# Patient Record
Sex: FEMALE | Race: WHITE
[De-identification: names, ages, dates, MRNs, and addresses within clinical notes are randomized per-mention and may not be internally consistent; named-entity substitution may affect disease eponyms.]

---

## 2018-05-24 ENCOUNTER — HOSPITAL ENCOUNTER (OUTPATIENT)
Dept: HOSPITAL 83 - D | Age: 68
Discharge: HOME | End: 2018-05-24
Attending: INTERNAL MEDICINE
Payer: MEDICARE

## 2018-05-24 DIAGNOSIS — E11.9: Primary | ICD-10-CM

## 2019-05-20 ENCOUNTER — OFFICE VISIT (OUTPATIENT)
Dept: FAMILY MEDICINE CLINIC | Age: 69
End: 2019-05-20
Payer: MEDICARE

## 2019-05-20 VITALS
DIASTOLIC BLOOD PRESSURE: 80 MMHG | OXYGEN SATURATION: 97 % | HEART RATE: 60 BPM | HEIGHT: 62 IN | SYSTOLIC BLOOD PRESSURE: 120 MMHG | TEMPERATURE: 97.6 F | WEIGHT: 185.6 LBS | BODY MASS INDEX: 34.16 KG/M2

## 2019-05-20 DIAGNOSIS — L03.114 CELLULITIS OF LEFT HAND: Primary | ICD-10-CM

## 2019-05-20 DIAGNOSIS — E11.9 TYPE 2 DIABETES MELLITUS WITHOUT COMPLICATION, WITH LONG-TERM CURRENT USE OF INSULIN (HCC): ICD-10-CM

## 2019-05-20 DIAGNOSIS — Z79.4 TYPE 2 DIABETES MELLITUS WITHOUT COMPLICATION, WITH LONG-TERM CURRENT USE OF INSULIN (HCC): ICD-10-CM

## 2019-05-20 DIAGNOSIS — I10 ESSENTIAL HYPERTENSION: ICD-10-CM

## 2019-05-20 PROBLEM — R31.29 MICROHEMATURIA: Status: ACTIVE | Noted: 2017-10-16

## 2019-05-20 PROCEDURE — 99213 OFFICE O/P EST LOW 20 MIN: CPT | Performed by: NURSE PRACTITIONER

## 2019-05-20 PROCEDURE — 96372 THER/PROPH/DIAG INJ SC/IM: CPT | Performed by: NURSE PRACTITIONER

## 2019-05-20 RX ORDER — POTASSIUM GLUCONATE 2 MEQ
TABLET ORAL
COMMUNITY

## 2019-05-20 RX ORDER — INSULIN GLARGINE 100 [IU]/ML
100 INJECTION, SOLUTION SUBCUTANEOUS NIGHTLY
COMMUNITY
Start: 2018-05-07

## 2019-05-20 RX ORDER — NICOTINE POLACRILEX 2 MG
GUM BUCCAL
COMMUNITY

## 2019-05-20 RX ORDER — CEPHALEXIN 500 MG/1
500 CAPSULE ORAL 3 TIMES DAILY
Qty: 21 CAPSULE | Refills: 0 | Status: SHIPPED | OUTPATIENT
Start: 2019-05-20 | End: 2019-06-07

## 2019-05-20 RX ORDER — TRIAMTERENE AND HYDROCHLOROTHIAZIDE 37.5; 25 MG/1; MG/1
CAPSULE ORAL
COMMUNITY
Start: 2016-02-21 | End: 2019-06-07 | Stop reason: SDUPTHER

## 2019-05-20 RX ORDER — BISOPROLOL FUMARATE AND HYDROCHLOROTHIAZIDE 5; 6.25 MG/1; MG/1
TABLET ORAL
COMMUNITY
Start: 2016-07-02 | End: 2019-06-07 | Stop reason: SDUPTHER

## 2019-05-20 RX ORDER — SIMVASTATIN 80 MG
TABLET ORAL
COMMUNITY
Start: 2016-02-21 | End: 2019-06-07 | Stop reason: SDUPTHER

## 2019-05-20 RX ORDER — METHYLPREDNISOLONE ACETATE 40 MG/ML
40 INJECTION, SUSPENSION INTRA-ARTICULAR; INTRALESIONAL; INTRAMUSCULAR; SOFT TISSUE ONCE
Status: COMPLETED | OUTPATIENT
Start: 2019-05-20 | End: 2019-05-20

## 2019-05-20 RX ORDER — KRILL/OM-3/DHA/EPA/PHOSPHO/AST 1000-230MG
CAPSULE ORAL
COMMUNITY

## 2019-05-20 RX ORDER — UREA 10 %
LOTION (ML) TOPICAL
COMMUNITY

## 2019-05-20 RX ORDER — LEVOTHYROXINE SODIUM 0.07 MG/1
TABLET ORAL
COMMUNITY
Start: 2016-02-21 | End: 2019-06-07 | Stop reason: SDUPTHER

## 2019-05-20 RX ORDER — FLUTICASONE PROPIONATE 50 MCG
SPRAY, SUSPENSION (ML) NASAL
COMMUNITY
End: 2019-11-15 | Stop reason: SDUPTHER

## 2019-05-20 RX ORDER — ESTRADIOL 1 MG/1
0.5 TABLET ORAL DAILY
COMMUNITY
Start: 2016-07-02

## 2019-05-20 RX ADMIN — METHYLPREDNISOLONE ACETATE 40 MG: 40 INJECTION, SUSPENSION INTRA-ARTICULAR; INTRALESIONAL; INTRAMUSCULAR; SOFT TISSUE at 10:26

## 2019-05-20 NOTE — PROGRESS NOTES
2019     Tracie Bansal (:  1950) is a 68 y.o. female, here for evaluation of the following medical concerns:  Chief Complaint   Patient presents with    Hand Pain     swelling, itchy, bug bite? HPI:  68 y.o. female presents with swollen left hand that happened over the weekend. Then yesterday another bite site on upper left arm. Feels fine otherwise. ROS: All pertinent positive and neg's per HPI    Past Medical History:   Diagnosis Date    Colon cancer ([facility] [address]     HTN (hypertension)        Current Outpatient Medications on File Prior to Visit   Medication Sig Dispense Refill    estradiol (ESTRACE) 1 MG tablet Take by mouth      insulin glargine (LANTUS) 100 UNIT/ML injection vial       levothyroxine (SYNTHROID) 75 MCG tablet Take by mouth      metFORMIN (GLUCOPHAGE) 1000 MG tablet 500 mg      simvastatin (ZOCOR) 80 MG tablet Take by mouth      triamterene-hydrochlorothiazide (DYAZIDE) 37.5-25 MG per capsule Take by mouth      bisoprolol-hydrochlorothiazide (ZIAC) 5-6.25 MG per tablet Take by mouth      ASA-APAP-Caff Buffered 227-194-33 MG TABS Take by mouth      L-Lysine 1000 MG TABS Take by mouth      Magnesium 100 MG CAPS       Omega-3 Krill Oil 1000 MG CAPS Take by mouth      Potassium Gluconate 2 MEQ TABS Take by mouth      Biotin 1 MG CAPS Take by mouth      fluticasone (FLONASE) 50 MCG/ACT nasal spray       folic acid (FOLVITE) 517 MCG tablet Take by mouth       No current facility-administered medications on file prior to visit. Allergies   Allergen Reactions    Levaquin [Levofloxacin In D5w]      Itchy         No family history on file.     Past Surgical History:   Procedure Laterality Date    BLADDER REPAIR      HEMICOLECTOMY         Social History     Tobacco Use    Smoking status: Not on file   Substance Use Topics    Alcohol use: Not on file    Drug use: Not on file       ROS:      Review of Systems            Vitals:    19 0954   BP:

## 2019-06-03 ENCOUNTER — TELEPHONE (OUTPATIENT)
Dept: FAMILY MEDICINE CLINIC | Age: 69
End: 2019-06-03

## 2019-06-03 NOTE — TELEPHONE ENCOUNTER
While the afternoon blood sugars are slightly high her fasting sugars are okay.  No additional changes at this time

## 2019-06-07 ENCOUNTER — OFFICE VISIT (OUTPATIENT)
Dept: FAMILY MEDICINE CLINIC | Age: 69
End: 2019-06-07
Payer: MEDICARE

## 2019-06-07 ENCOUNTER — HOSPITAL ENCOUNTER (OUTPATIENT)
Age: 69
Discharge: HOME | End: 2019-06-09
Payer: MEDICARE

## 2019-06-07 VITALS
BODY MASS INDEX: 33.86 KG/M2 | HEIGHT: 62 IN | OXYGEN SATURATION: 98 % | HEART RATE: 81 BPM | TEMPERATURE: 98 F | DIASTOLIC BLOOD PRESSURE: 86 MMHG | WEIGHT: 184 LBS | SYSTOLIC BLOOD PRESSURE: 124 MMHG

## 2019-06-07 DIAGNOSIS — E11.9 TYPE 2 DIABETES MELLITUS WITHOUT COMPLICATION, WITHOUT LONG-TERM CURRENT USE OF INSULIN (HCC): ICD-10-CM

## 2019-06-07 DIAGNOSIS — I10 ESSENTIAL HYPERTENSION, BENIGN: ICD-10-CM

## 2019-06-07 DIAGNOSIS — Z12.31 SCREENING MAMMOGRAM, ENCOUNTER FOR: ICD-10-CM

## 2019-06-07 DIAGNOSIS — E78.2 MIXED HYPERLIPIDEMIA: ICD-10-CM

## 2019-06-07 DIAGNOSIS — E11.9 TYPE 2 DIABETES MELLITUS WITHOUT COMPLICATION, WITHOUT LONG-TERM CURRENT USE OF INSULIN (HCC): Primary | ICD-10-CM

## 2019-06-07 PROBLEM — Z85.038 PERSONAL HISTORY OF COLON CANCER: Status: ACTIVE | Noted: 2019-06-07

## 2019-06-07 LAB
ALBUMIN SERPL-MCNC: 4 G/DL (ref 3.5–5.2)
ALP BLD-CCNC: 54 U/L (ref 35–104)
ALT SERPL-CCNC: 16 U/L (ref 0–32)
ANION GAP SERPL CALCULATED.3IONS-SCNC: 17 MMOL/L (ref 7–16)
AST SERPL-CCNC: 13 U/L (ref 0–31)
BASOPHILS ABSOLUTE: 0.03 E9/L (ref 0–0.2)
BASOPHILS RELATIVE PERCENT: 0.3 % (ref 0–2)
BILIRUB SERPL-MCNC: <0.2 MG/DL (ref 0–1.2)
BILIRUBIN DIRECT: <0.2 MG/DL (ref 0–0.3)
BILIRUBIN, INDIRECT: NORMAL MG/DL (ref 0–1)
BUN BLDV-MCNC: 18 MG/DL (ref 8–23)
CALCIUM SERPL-MCNC: 9.4 MG/DL (ref 8.6–10.2)
CHLORIDE BLD-SCNC: 100 MMOL/L (ref 98–107)
CHOLESTEROL, TOTAL: 202 MG/DL (ref 0–199)
CO2: 25 MMOL/L (ref 22–29)
CREAT SERPL-MCNC: 1 MG/DL (ref 0.5–1)
EOSINOPHILS ABSOLUTE: 0.11 E9/L (ref 0.05–0.5)
EOSINOPHILS RELATIVE PERCENT: 1.2 % (ref 0–6)
GFR AFRICAN AMERICAN: >60
GFR NON-AFRICAN AMERICAN: 55 ML/MIN/1.73
GLUCOSE BLD-MCNC: 80 MG/DL (ref 74–99)
HBA1C MFR BLD: 7.3 % (ref 4–5.6)
HCT VFR BLD CALC: 36.5 % (ref 34–48)
HDLC SERPL-MCNC: 58 MG/DL
HEMOGLOBIN: 11.4 G/DL (ref 11.5–15.5)
IMMATURE GRANULOCYTES #: 0.04 E9/L
IMMATURE GRANULOCYTES %: 0.4 % (ref 0–5)
LDL CHOLESTEROL CALCULATED: 106 MG/DL (ref 0–99)
LYMPHOCYTES ABSOLUTE: 2.62 E9/L (ref 1.5–4)
LYMPHOCYTES RELATIVE PERCENT: 27.7 % (ref 20–42)
MCH RBC QN AUTO: 27.7 PG (ref 26–35)
MCHC RBC AUTO-ENTMCNC: 31.2 % (ref 32–34.5)
MCV RBC AUTO: 88.6 FL (ref 80–99.9)
MONOCYTES ABSOLUTE: 0.67 E9/L (ref 0.1–0.95)
MONOCYTES RELATIVE PERCENT: 7.1 % (ref 2–12)
NEUTROPHILS ABSOLUTE: 6 E9/L (ref 1.8–7.3)
NEUTROPHILS RELATIVE PERCENT: 63.3 % (ref 43–80)
PDW BLD-RTO: 14 FL (ref 11.5–15)
PHOSPHORUS: 4 MG/DL (ref 2.5–4.5)
PLATELET # BLD: 273 E9/L (ref 130–450)
PMV BLD AUTO: 10.8 FL (ref 7–12)
POTASSIUM SERPL-SCNC: 4.3 MMOL/L (ref 3.5–5)
RBC # BLD: 4.12 E12/L (ref 3.5–5.5)
SODIUM BLD-SCNC: 142 MMOL/L (ref 132–146)
TOTAL PROTEIN: 7 G/DL (ref 6.4–8.3)
TRIGL SERPL-MCNC: 192 MG/DL (ref 0–149)
VLDLC SERPL CALC-MCNC: 38 MG/DL
WBC # BLD: 9.5 E9/L (ref 4.5–11.5)

## 2019-06-07 PROCEDURE — 85025 COMPLETE CBC W/AUTO DIFF WBC: CPT

## 2019-06-07 PROCEDURE — 36415 COLL VENOUS BLD VENIPUNCTURE: CPT

## 2019-06-07 PROCEDURE — 82248 BILIRUBIN DIRECT: CPT

## 2019-06-07 PROCEDURE — 99214 OFFICE O/P EST MOD 30 MIN: CPT | Performed by: INTERNAL MEDICINE

## 2019-06-07 PROCEDURE — 84155 ASSAY OF PROTEIN SERUM: CPT

## 2019-06-07 PROCEDURE — 84460 ALANINE AMINO (ALT) (SGPT): CPT

## 2019-06-07 PROCEDURE — 82247 BILIRUBIN TOTAL: CPT

## 2019-06-07 PROCEDURE — 84075 ASSAY ALKALINE PHOSPHATASE: CPT

## 2019-06-07 PROCEDURE — 83036 HEMOGLOBIN GLYCOSYLATED A1C: CPT

## 2019-06-07 PROCEDURE — 84450 TRANSFERASE (AST) (SGOT): CPT

## 2019-06-07 PROCEDURE — 80061 LIPID PANEL: CPT

## 2019-06-07 PROCEDURE — 80069 RENAL FUNCTION PANEL: CPT

## 2019-06-07 RX ORDER — BISOPROLOL FUMARATE AND HYDROCHLOROTHIAZIDE 5; 6.25 MG/1; MG/1
1 TABLET ORAL DAILY
Qty: 90 TABLET | Refills: 1 | Status: SHIPPED | OUTPATIENT
Start: 2019-06-07 | End: 2019-11-15 | Stop reason: SDUPTHER

## 2019-06-07 RX ORDER — TRIAMTERENE AND HYDROCHLOROTHIAZIDE 37.5; 25 MG/1; MG/1
1 CAPSULE ORAL DAILY
Qty: 90 CAPSULE | Refills: 1 | Status: SHIPPED | OUTPATIENT
Start: 2019-06-07 | End: 2019-11-15 | Stop reason: SDUPTHER

## 2019-06-07 RX ORDER — UBIDECARENONE 75 MG
50 CAPSULE ORAL DAILY
COMMUNITY

## 2019-06-07 RX ORDER — LEVOTHYROXINE SODIUM 0.07 MG/1
75 TABLET ORAL DAILY
Qty: 90 TABLET | Refills: 1 | Status: SHIPPED | OUTPATIENT
Start: 2019-06-07 | End: 2019-11-15 | Stop reason: SDUPTHER

## 2019-06-07 RX ORDER — SIMVASTATIN 80 MG
80 TABLET ORAL NIGHTLY
Qty: 90 TABLET | Refills: 1 | Status: SHIPPED | OUTPATIENT
Start: 2019-06-07 | End: 2019-11-15 | Stop reason: SDUPTHER

## 2019-06-07 ASSESSMENT — PATIENT HEALTH QUESTIONNAIRE - PHQ9
1. LITTLE INTEREST OR PLEASURE IN DOING THINGS: 0
SUM OF ALL RESPONSES TO PHQ QUESTIONS 1-9: 0
SUM OF ALL RESPONSES TO PHQ9 QUESTIONS 1 & 2: 0
SUM OF ALL RESPONSES TO PHQ QUESTIONS 1-9: 0
2. FEELING DOWN, DEPRESSED OR HOPELESS: 0

## 2019-06-07 NOTE — PROGRESS NOTES
mg by mouth daily QOTHER DAY Yes Historical Provider, MD   fluticasone (FLONASE) 50 MCG/ACT nasal spray  Yes Historical Provider, MD   folic acid (FOLVITE) 395 MCG tablet Take by mouth Yes Historical Provider, MD   insulin glargine (LANTUS) 100 UNIT/ML injection vial Inject 100 Units into the skin nightly 35U Yes Historical Provider, MD        Social History     Tobacco Use    Smoking status: Never Smoker    Smokeless tobacco: Never Used   Substance Use Topics    Alcohol use: Not on file        Vitals:    06/07/19 1116   BP: 124/86   Pulse: 81   Temp: 98 °F (36.7 °C)   TempSrc: Temporal   SpO2: 98%   Weight: 184 lb (83.5 kg)   Height: 5' 2\" (1.575 m)     Estimated body mass index is 33.65 kg/m² as calculated from the following:    Height as of this encounter: 5' 2\" (1.575 m). Weight as of this encounter: 184 lb (83.5 kg). Physical Exam   Constitutional: She appears well-developed. HENT:   Head: Normocephalic and atraumatic. Right Ear: External ear normal.   Left Ear: External ear normal.   Mouth/Throat: Oropharynx is clear and moist.   Eyes: Pupils are equal, round, and reactive to light. Conjunctivae and EOM are normal.   Neck: Normal range of motion. No tracheal deviation present. No thyromegaly present. Cardiovascular: Normal rate, regular rhythm, normal heart sounds and intact distal pulses. Exam reveals no gallop and no friction rub. No murmur heard. Pulmonary/Chest: Effort normal and breath sounds normal. No respiratory distress. She has no wheezes. She has no rales. Abdominal: She exhibits no distension. There is no tenderness. Genitourinary: Rectal exam shows guaiac negative stool. Musculoskeletal: Normal range of motion. She exhibits no edema. Lymphadenopathy:     She has no cervical adenopathy. Neurological: She is alert. She displays normal reflexes. No cranial nerve deficit or sensory deficit. Coordination normal.   Skin: Skin is warm and dry.  Capillary refill takes less than 2 seconds. Psychiatric: Her behavior is normal.       ASSESSMENT/PLAN:    ICD-10-CM    1. Type 2 diabetes mellitus without complication, without long-term current use of insulin (HCC) E11.9 Hemoglobin A1C   2. Essential hypertension, benign I10 CBC Auto Differential     Renal Panel   3. Mixed hyperlipidemia E78.2 Lipid Panel     Hepatic Function Panel   4. Screening mammogram, encounter for Z12.31 JOSE DIGITAL SCREENING AUGMENTED BILATERAL      She is requesting a mammogram in order has been placed. Laboratory to be performed for monitoring of her diabetes. Refill medications all given today. Return in about 4 months (around 10/7/2019). An electronic signature was used to authenticate this note.     --Olga Lidia Monaco, DO on 6/11/2019 at 1:07 PM

## 2019-06-10 ENCOUNTER — TELEPHONE (OUTPATIENT)
Dept: FAMILY MEDICINE CLINIC | Age: 69
End: 2019-06-10

## 2019-06-10 NOTE — TELEPHONE ENCOUNTER
----- Message from Kesha Piedra DO sent at 6/8/2019  8:53 AM EDT -----  Very mild aneia. Kidney function good. The HbA1c is ok at 7.3 and the cholesterol barely high at 202.   Most of these high numbers are barely high, no medication changes

## 2019-06-10 NOTE — TELEPHONE ENCOUNTER
Left a message at the home # that results have been reviewed and that we are mailing a copy to the home address.

## 2019-07-02 ENCOUNTER — TELEPHONE (OUTPATIENT)
Dept: FAMILY MEDICINE CLINIC | Age: 69
End: 2019-07-02

## 2019-10-22 ENCOUNTER — OFFICE VISIT (OUTPATIENT)
Dept: FAMILY MEDICINE CLINIC | Age: 69
End: 2019-10-22
Payer: MEDICARE

## 2019-10-22 VITALS
HEIGHT: 62 IN | BODY MASS INDEX: 34.6 KG/M2 | WEIGHT: 188 LBS | SYSTOLIC BLOOD PRESSURE: 112 MMHG | TEMPERATURE: 97.5 F | DIASTOLIC BLOOD PRESSURE: 70 MMHG | HEART RATE: 64 BPM | OXYGEN SATURATION: 96 %

## 2019-10-22 DIAGNOSIS — Z79.4 TYPE 2 DIABETES MELLITUS WITHOUT COMPLICATION, WITH LONG-TERM CURRENT USE OF INSULIN (HCC): ICD-10-CM

## 2019-10-22 DIAGNOSIS — E11.9 TYPE 2 DIABETES MELLITUS WITHOUT COMPLICATION, WITH LONG-TERM CURRENT USE OF INSULIN (HCC): ICD-10-CM

## 2019-10-22 DIAGNOSIS — J01.00 ACUTE NON-RECURRENT MAXILLARY SINUSITIS: Primary | ICD-10-CM

## 2019-10-22 PROCEDURE — G8427 DOCREV CUR MEDS BY ELIG CLIN: HCPCS | Performed by: INTERNAL MEDICINE

## 2019-10-22 PROCEDURE — 1090F PRES/ABSN URINE INCON ASSESS: CPT | Performed by: INTERNAL MEDICINE

## 2019-10-22 PROCEDURE — G8417 CALC BMI ABV UP PARAM F/U: HCPCS | Performed by: INTERNAL MEDICINE

## 2019-10-22 PROCEDURE — 1036F TOBACCO NON-USER: CPT | Performed by: INTERNAL MEDICINE

## 2019-10-22 PROCEDURE — 99213 OFFICE O/P EST LOW 20 MIN: CPT | Performed by: INTERNAL MEDICINE

## 2019-10-22 PROCEDURE — 3014F SCREEN MAMMO DOC REV: CPT | Performed by: INTERNAL MEDICINE

## 2019-10-22 PROCEDURE — 1123F ACP DISCUSS/DSCN MKR DOCD: CPT | Performed by: INTERNAL MEDICINE

## 2019-10-22 PROCEDURE — 3017F COLORECTAL CA SCREEN DOC REV: CPT | Performed by: INTERNAL MEDICINE

## 2019-10-22 PROCEDURE — 2022F DILAT RTA XM EVC RTNOPTHY: CPT | Performed by: INTERNAL MEDICINE

## 2019-10-22 PROCEDURE — G8400 PT W/DXA NO RESULTS DOC: HCPCS | Performed by: INTERNAL MEDICINE

## 2019-10-22 PROCEDURE — G8484 FLU IMMUNIZE NO ADMIN: HCPCS | Performed by: INTERNAL MEDICINE

## 2019-10-22 PROCEDURE — 4040F PNEUMOC VAC/ADMIN/RCVD: CPT | Performed by: INTERNAL MEDICINE

## 2019-10-22 RX ORDER — AMOXICILLIN AND CLAVULANATE POTASSIUM 875; 125 MG/1; MG/1
1 TABLET, FILM COATED ORAL 2 TIMES DAILY
Qty: 42 TABLET | Refills: 0 | Status: SHIPPED | OUTPATIENT
Start: 2019-10-22 | End: 2019-10-29

## 2019-10-22 RX ORDER — CHLORPHENIRAMINE MALEATE 4 MG/1
4 TABLET ORAL EVERY 6 HOURS PRN
Qty: 30 TABLET | Refills: 4 | Status: SHIPPED | OUTPATIENT
Start: 2019-10-22 | End: 2019-11-21

## 2019-11-15 ENCOUNTER — HOSPITAL ENCOUNTER (OUTPATIENT)
Age: 69
Discharge: HOME | End: 2019-11-17
Payer: MEDICARE

## 2019-11-15 ENCOUNTER — OFFICE VISIT (OUTPATIENT)
Dept: FAMILY MEDICINE CLINIC | Age: 69
End: 2019-11-15
Payer: MEDICARE

## 2019-11-15 VITALS
OXYGEN SATURATION: 96 % | TEMPERATURE: 97.5 F | BODY MASS INDEX: 34.04 KG/M2 | DIASTOLIC BLOOD PRESSURE: 72 MMHG | HEART RATE: 60 BPM | HEIGHT: 62 IN | WEIGHT: 185 LBS | SYSTOLIC BLOOD PRESSURE: 112 MMHG

## 2019-11-15 DIAGNOSIS — Z23 FLU VACCINE NEED: ICD-10-CM

## 2019-11-15 DIAGNOSIS — I10 ESSENTIAL HYPERTENSION, BENIGN: ICD-10-CM

## 2019-11-15 DIAGNOSIS — E11.9 TYPE 2 DIABETES MELLITUS WITHOUT COMPLICATION, WITH LONG-TERM CURRENT USE OF INSULIN (HCC): ICD-10-CM

## 2019-11-15 DIAGNOSIS — E11.9 TYPE 2 DIABETES MELLITUS WITHOUT COMPLICATION, WITH LONG-TERM CURRENT USE OF INSULIN (HCC): Primary | ICD-10-CM

## 2019-11-15 DIAGNOSIS — E78.2 MIXED HYPERLIPIDEMIA: ICD-10-CM

## 2019-11-15 DIAGNOSIS — Z79.4 TYPE 2 DIABETES MELLITUS WITHOUT COMPLICATION, WITH LONG-TERM CURRENT USE OF INSULIN (HCC): ICD-10-CM

## 2019-11-15 DIAGNOSIS — Z79.4 TYPE 2 DIABETES MELLITUS WITHOUT COMPLICATION, WITH LONG-TERM CURRENT USE OF INSULIN (HCC): Primary | ICD-10-CM

## 2019-11-15 LAB
ALBUMIN SERPL-MCNC: 4.1 G/DL (ref 3.5–5.2)
ALP BLD-CCNC: 46 U/L (ref 35–104)
ALT SERPL-CCNC: 15 U/L (ref 0–32)
ANION GAP SERPL CALCULATED.3IONS-SCNC: 20 MMOL/L (ref 7–16)
AST SERPL-CCNC: 19 U/L (ref 0–31)
BASOPHILS ABSOLUTE: 0.03 E9/L (ref 0–0.2)
BASOPHILS RELATIVE PERCENT: 0.3 % (ref 0–2)
BILIRUB SERPL-MCNC: 0.2 MG/DL (ref 0–1.2)
BILIRUBIN DIRECT: <0.2 MG/DL (ref 0–0.3)
BILIRUBIN, INDIRECT: NORMAL MG/DL (ref 0–1)
BUN BLDV-MCNC: 15 MG/DL (ref 8–23)
CALCIUM SERPL-MCNC: 8.9 MG/DL (ref 8.6–10.2)
CHLORIDE BLD-SCNC: 100 MMOL/L (ref 98–107)
CHOLESTEROL, TOTAL: 207 MG/DL (ref 0–199)
CO2: 21 MMOL/L (ref 22–29)
CREAT SERPL-MCNC: 1.1 MG/DL (ref 0.5–1)
EOSINOPHILS ABSOLUTE: 0.18 E9/L (ref 0.05–0.5)
EOSINOPHILS RELATIVE PERCENT: 1.8 % (ref 0–6)
GFR AFRICAN AMERICAN: 59
GFR NON-AFRICAN AMERICAN: 49 ML/MIN/1.73
GLUCOSE BLD-MCNC: 97 MG/DL (ref 74–99)
HBA1C MFR BLD: 7.9 % (ref 4–5.6)
HCT VFR BLD CALC: 39.2 % (ref 34–48)
HDLC SERPL-MCNC: 47 MG/DL
HEMOGLOBIN: 11.8 G/DL (ref 11.5–15.5)
IMMATURE GRANULOCYTES #: 0.05 E9/L
IMMATURE GRANULOCYTES %: 0.5 % (ref 0–5)
LDL CHOLESTEROL CALCULATED: 108 MG/DL (ref 0–99)
LYMPHOCYTES ABSOLUTE: 2.33 E9/L (ref 1.5–4)
LYMPHOCYTES RELATIVE PERCENT: 22.8 % (ref 20–42)
MCH RBC QN AUTO: 26.3 PG (ref 26–35)
MCHC RBC AUTO-ENTMCNC: 30.1 % (ref 32–34.5)
MCV RBC AUTO: 87.5 FL (ref 80–99.9)
MONOCYTES ABSOLUTE: 0.6 E9/L (ref 0.1–0.95)
MONOCYTES RELATIVE PERCENT: 5.9 % (ref 2–12)
NEUTROPHILS ABSOLUTE: 7.02 E9/L (ref 1.8–7.3)
NEUTROPHILS RELATIVE PERCENT: 68.7 % (ref 43–80)
PDW BLD-RTO: 14 FL (ref 11.5–15)
PHOSPHORUS: 4.1 MG/DL (ref 2.5–4.5)
PLATELET # BLD: 294 E9/L (ref 130–450)
PMV BLD AUTO: 10.6 FL (ref 7–12)
POTASSIUM SERPL-SCNC: 4.1 MMOL/L (ref 3.5–5)
RBC # BLD: 4.48 E12/L (ref 3.5–5.5)
SODIUM BLD-SCNC: 141 MMOL/L (ref 132–146)
TOTAL PROTEIN: 7 G/DL (ref 6.4–8.3)
TRIGL SERPL-MCNC: 262 MG/DL (ref 0–149)
VLDLC SERPL CALC-MCNC: 52 MG/DL
WBC # BLD: 10.2 E9/L (ref 4.5–11.5)

## 2019-11-15 PROCEDURE — 4040F PNEUMOC VAC/ADMIN/RCVD: CPT | Performed by: INTERNAL MEDICINE

## 2019-11-15 PROCEDURE — 84460 ALANINE AMINO (ALT) (SGPT): CPT

## 2019-11-15 PROCEDURE — 84155 ASSAY OF PROTEIN SERUM: CPT

## 2019-11-15 PROCEDURE — G8417 CALC BMI ABV UP PARAM F/U: HCPCS | Performed by: INTERNAL MEDICINE

## 2019-11-15 PROCEDURE — 90653 IIV ADJUVANT VACCINE IM: CPT | Performed by: INTERNAL MEDICINE

## 2019-11-15 PROCEDURE — 2022F DILAT RTA XM EVC RTNOPTHY: CPT | Performed by: INTERNAL MEDICINE

## 2019-11-15 PROCEDURE — 1090F PRES/ABSN URINE INCON ASSESS: CPT | Performed by: INTERNAL MEDICINE

## 2019-11-15 PROCEDURE — 83036 HEMOGLOBIN GLYCOSYLATED A1C: CPT

## 2019-11-15 PROCEDURE — 84075 ASSAY ALKALINE PHOSPHATASE: CPT

## 2019-11-15 PROCEDURE — G9899 SCRN MAM PERF RSLTS DOC: HCPCS | Performed by: INTERNAL MEDICINE

## 2019-11-15 PROCEDURE — G8400 PT W/DXA NO RESULTS DOC: HCPCS | Performed by: INTERNAL MEDICINE

## 2019-11-15 PROCEDURE — 80061 LIPID PANEL: CPT

## 2019-11-15 PROCEDURE — G0008 ADMIN INFLUENZA VIRUS VAC: HCPCS | Performed by: INTERNAL MEDICINE

## 2019-11-15 PROCEDURE — 82248 BILIRUBIN DIRECT: CPT

## 2019-11-15 PROCEDURE — G8427 DOCREV CUR MEDS BY ELIG CLIN: HCPCS | Performed by: INTERNAL MEDICINE

## 2019-11-15 PROCEDURE — 99214 OFFICE O/P EST MOD 30 MIN: CPT | Performed by: INTERNAL MEDICINE

## 2019-11-15 PROCEDURE — 84450 TRANSFERASE (AST) (SGOT): CPT

## 2019-11-15 PROCEDURE — G8482 FLU IMMUNIZE ORDER/ADMIN: HCPCS | Performed by: INTERNAL MEDICINE

## 2019-11-15 PROCEDURE — 82247 BILIRUBIN TOTAL: CPT

## 2019-11-15 PROCEDURE — 3017F COLORECTAL CA SCREEN DOC REV: CPT | Performed by: INTERNAL MEDICINE

## 2019-11-15 PROCEDURE — 1123F ACP DISCUSS/DSCN MKR DOCD: CPT | Performed by: INTERNAL MEDICINE

## 2019-11-15 PROCEDURE — 36415 COLL VENOUS BLD VENIPUNCTURE: CPT

## 2019-11-15 PROCEDURE — 80069 RENAL FUNCTION PANEL: CPT

## 2019-11-15 PROCEDURE — 85025 COMPLETE CBC W/AUTO DIFF WBC: CPT

## 2019-11-15 PROCEDURE — 1036F TOBACCO NON-USER: CPT | Performed by: INTERNAL MEDICINE

## 2019-11-15 PROCEDURE — 3051F HG A1C>EQUAL 7.0%<8.0%: CPT | Performed by: INTERNAL MEDICINE

## 2019-11-15 RX ORDER — LEVOTHYROXINE SODIUM 0.07 MG/1
75 TABLET ORAL DAILY
Qty: 90 TABLET | Refills: 1 | Status: SHIPPED
Start: 2019-11-15 | End: 2020-02-14 | Stop reason: SDUPTHER

## 2019-11-15 RX ORDER — BLOOD SUGAR DIAGNOSTIC
1 STRIP MISCELLANEOUS DAILY
Qty: 100 EACH | Refills: 5 | Status: SHIPPED
Start: 2019-11-15 | End: 2020-11-30 | Stop reason: SDUPTHER

## 2019-11-15 RX ORDER — TRIAMTERENE AND HYDROCHLOROTHIAZIDE 37.5; 25 MG/1; MG/1
1 CAPSULE ORAL DAILY
Qty: 90 CAPSULE | Refills: 1 | Status: SHIPPED
Start: 2019-11-15 | End: 2020-02-14 | Stop reason: SDUPTHER

## 2019-11-15 RX ORDER — GLIMEPIRIDE 1 MG/1
1 TABLET ORAL EVERY MORNING
Qty: 90 TABLET | Refills: 0 | Status: SHIPPED
Start: 2019-11-15 | End: 2020-02-14 | Stop reason: SDUPTHER

## 2019-11-15 RX ORDER — BISOPROLOL FUMARATE AND HYDROCHLOROTHIAZIDE 5; 6.25 MG/1; MG/1
1 TABLET ORAL DAILY
Qty: 90 TABLET | Refills: 1 | Status: SHIPPED
Start: 2019-11-15 | End: 2020-02-14 | Stop reason: SDUPTHER

## 2019-11-15 RX ORDER — SIMVASTATIN 80 MG
80 TABLET ORAL NIGHTLY
Qty: 90 TABLET | Refills: 1 | Status: SHIPPED
Start: 2019-11-15 | End: 2020-02-14 | Stop reason: SDUPTHER

## 2019-11-15 RX ORDER — FLUTICASONE PROPIONATE 50 MCG
1 SPRAY, SUSPENSION (ML) NASAL DAILY
Qty: 1 BOTTLE | Refills: 3 | Status: SHIPPED | OUTPATIENT
Start: 2019-11-15

## 2020-02-14 ENCOUNTER — HOSPITAL ENCOUNTER (OUTPATIENT)
Age: 70
Discharge: HOME | End: 2020-02-16
Payer: MEDICARE

## 2020-02-14 ENCOUNTER — OFFICE VISIT (OUTPATIENT)
Dept: FAMILY MEDICINE CLINIC | Age: 70
End: 2020-02-14
Payer: MEDICARE

## 2020-02-14 VITALS
BODY MASS INDEX: 34.41 KG/M2 | TEMPERATURE: 97.1 F | OXYGEN SATURATION: 98 % | SYSTOLIC BLOOD PRESSURE: 136 MMHG | WEIGHT: 187 LBS | DIASTOLIC BLOOD PRESSURE: 72 MMHG | HEIGHT: 62 IN | HEART RATE: 70 BPM | RESPIRATION RATE: 18 BRPM

## 2020-02-14 LAB
ALBUMIN SERPL-MCNC: 3.8 G/DL (ref 3.5–5.2)
ALP BLD-CCNC: 52 U/L (ref 35–104)
ALT SERPL-CCNC: 14 U/L (ref 0–32)
ANION GAP SERPL CALCULATED.3IONS-SCNC: 16 MMOL/L (ref 7–16)
AST SERPL-CCNC: 14 U/L (ref 0–31)
BILIRUB SERPL-MCNC: <0.2 MG/DL (ref 0–1.2)
BILIRUBIN DIRECT: <0.2 MG/DL (ref 0–0.3)
BILIRUBIN, INDIRECT: NORMAL MG/DL (ref 0–1)
BUN BLDV-MCNC: 17 MG/DL (ref 8–23)
CALCIUM SERPL-MCNC: 9.1 MG/DL (ref 8.6–10.2)
CHLORIDE BLD-SCNC: 101 MMOL/L (ref 98–107)
CHOLESTEROL, TOTAL: 209 MG/DL (ref 0–199)
CO2: 25 MMOL/L (ref 22–29)
CREAT SERPL-MCNC: 1 MG/DL (ref 0.5–1)
GFR AFRICAN AMERICAN: >60
GFR NON-AFRICAN AMERICAN: 55 ML/MIN/1.73
GLUCOSE BLD-MCNC: 79 MG/DL (ref 74–99)
HBA1C MFR BLD: 6.8 % (ref 4–5.6)
HDLC SERPL-MCNC: 48 MG/DL
LDL CHOLESTEROL CALCULATED: 104 MG/DL (ref 0–99)
PHOSPHORUS: 3.5 MG/DL (ref 2.5–4.5)
POTASSIUM SERPL-SCNC: 4 MMOL/L (ref 3.5–5)
SODIUM BLD-SCNC: 142 MMOL/L (ref 132–146)
TOTAL PROTEIN: 6.9 G/DL (ref 6.4–8.3)
TRIGL SERPL-MCNC: 287 MG/DL (ref 0–149)
VLDLC SERPL CALC-MCNC: 57 MG/DL

## 2020-02-14 PROCEDURE — 2022F DILAT RTA XM EVC RTNOPTHY: CPT | Performed by: INTERNAL MEDICINE

## 2020-02-14 PROCEDURE — 3046F HEMOGLOBIN A1C LEVEL >9.0%: CPT | Performed by: INTERNAL MEDICINE

## 2020-02-14 PROCEDURE — 82248 BILIRUBIN DIRECT: CPT

## 2020-02-14 PROCEDURE — 80061 LIPID PANEL: CPT

## 2020-02-14 PROCEDURE — 82247 BILIRUBIN TOTAL: CPT

## 2020-02-14 PROCEDURE — 36415 COLL VENOUS BLD VENIPUNCTURE: CPT

## 2020-02-14 PROCEDURE — 1090F PRES/ABSN URINE INCON ASSESS: CPT | Performed by: INTERNAL MEDICINE

## 2020-02-14 PROCEDURE — 1036F TOBACCO NON-USER: CPT | Performed by: INTERNAL MEDICINE

## 2020-02-14 PROCEDURE — G8417 CALC BMI ABV UP PARAM F/U: HCPCS | Performed by: INTERNAL MEDICINE

## 2020-02-14 PROCEDURE — G9899 SCRN MAM PERF RSLTS DOC: HCPCS | Performed by: INTERNAL MEDICINE

## 2020-02-14 PROCEDURE — 4040F PNEUMOC VAC/ADMIN/RCVD: CPT | Performed by: INTERNAL MEDICINE

## 2020-02-14 PROCEDURE — 84460 ALANINE AMINO (ALT) (SGPT): CPT

## 2020-02-14 PROCEDURE — G8427 DOCREV CUR MEDS BY ELIG CLIN: HCPCS | Performed by: INTERNAL MEDICINE

## 2020-02-14 PROCEDURE — G8482 FLU IMMUNIZE ORDER/ADMIN: HCPCS | Performed by: INTERNAL MEDICINE

## 2020-02-14 PROCEDURE — 83036 HEMOGLOBIN GLYCOSYLATED A1C: CPT

## 2020-02-14 PROCEDURE — 3017F COLORECTAL CA SCREEN DOC REV: CPT | Performed by: INTERNAL MEDICINE

## 2020-02-14 PROCEDURE — 84450 TRANSFERASE (AST) (SGOT): CPT

## 2020-02-14 PROCEDURE — 80069 RENAL FUNCTION PANEL: CPT

## 2020-02-14 PROCEDURE — 84075 ASSAY ALKALINE PHOSPHATASE: CPT

## 2020-02-14 PROCEDURE — G8400 PT W/DXA NO RESULTS DOC: HCPCS | Performed by: INTERNAL MEDICINE

## 2020-02-14 PROCEDURE — 84155 ASSAY OF PROTEIN SERUM: CPT

## 2020-02-14 PROCEDURE — 99214 OFFICE O/P EST MOD 30 MIN: CPT | Performed by: INTERNAL MEDICINE

## 2020-02-14 PROCEDURE — 1123F ACP DISCUSS/DSCN MKR DOCD: CPT | Performed by: INTERNAL MEDICINE

## 2020-02-14 RX ORDER — BISOPROLOL FUMARATE AND HYDROCHLOROTHIAZIDE 5; 6.25 MG/1; MG/1
1 TABLET ORAL DAILY
Qty: 90 TABLET | Refills: 1 | Status: SHIPPED
Start: 2020-02-14 | End: 2020-08-14 | Stop reason: SDUPTHER

## 2020-02-14 RX ORDER — TRIAMTERENE AND HYDROCHLOROTHIAZIDE 37.5; 25 MG/1; MG/1
1 CAPSULE ORAL DAILY
Qty: 90 CAPSULE | Refills: 1 | Status: SHIPPED
Start: 2020-02-14 | End: 2020-08-14 | Stop reason: SDUPTHER

## 2020-02-14 RX ORDER — GLIMEPIRIDE 1 MG/1
1 TABLET ORAL EVERY MORNING
Qty: 90 TABLET | Refills: 0 | Status: SHIPPED
Start: 2020-02-14 | End: 2020-04-28 | Stop reason: SDUPTHER

## 2020-02-14 RX ORDER — LEVOTHYROXINE SODIUM 0.07 MG/1
75 TABLET ORAL DAILY
Qty: 90 TABLET | Refills: 1 | Status: SHIPPED
Start: 2020-02-14 | End: 2020-08-14 | Stop reason: SDUPTHER

## 2020-02-14 RX ORDER — SIMVASTATIN 80 MG
80 TABLET ORAL NIGHTLY
Qty: 90 TABLET | Refills: 1 | Status: SHIPPED
Start: 2020-02-14 | End: 2020-05-15

## 2020-02-14 NOTE — PROGRESS NOTES
effort is normal.      Breath sounds: No wheezing, rhonchi or rales. Musculoskeletal:         General: No swelling. Right lower leg: No edema. Left lower leg: No edema. Lymphadenopathy:      Cervical: No cervical adenopathy. Skin:     General: Skin is warm and dry. Capillary Refill: Capillary refill takes less than 2 seconds. Neurological:      Mental Status: She is alert. Psychiatric:         Mood and Affect: Mood normal.         Behavior: Behavior normal.     And is intact of the lower extremities. There is no edema of the legs noted. Fingernails are intact and strong. ASSESSMENT/PLAN:  Leora Davis was seen today for 3 month follow-up and medication refill. Diagnoses and all orders for this visit:    Type 2 diabetes mellitus without complication, with long-term current use of insulin ([address]  -     RENAL FUNCTION PANEL; Future  -     HEMOGLOBIN A1C; Future    Mixed hyperlipidemia  -     Lipid Panel; Future  -     Hepatic Function Panel; Future    Essential hypertension, benign  -     RENAL FUNCTION PANEL; Future    Other orders  -     levothyroxine (SYNTHROID) 75 MCG tablet; Take 1 tablet by mouth Daily  -     triamterene-hydrochlorothiazide (DYAZIDE) 37.5-25 MG per capsule; Take 1 capsule by mouth daily  -     bisoprolol-hydrochlorothiazide (ZIAC) 5-6.25 MG per tablet; Take 1 tablet by mouth daily  -     simvastatin (ZOCOR) 80 MG tablet; Take 1 tablet by mouth nightly  -     metFORMIN (GLUCOPHAGE) 1000 MG tablet; Take 0.5 tablets by mouth 2 times daily (with meals)  -     glimepiride (AMARYL) 1 MG tablet; Take 1 tablet by mouth every morning       Laboratories to be performed back in 3 months. Instructions given to her today. She is to continue checking her blood sugars twice a day and fax them to me in 2 to 3 weeks. We are decreasing the Lantus down to 25 units at night. I may have to increase her Amaryl in the morning to help lower the afternoon blood sugars.   Refill of medications

## 2020-04-28 RX ORDER — CHLORPHENIRAMINE MALEATE 4 MG/1
4 TABLET ORAL EVERY 6 HOURS PRN
Qty: 90 TABLET | Refills: 4 | Status: SHIPPED | OUTPATIENT
Start: 2020-04-28 | End: 2020-05-28

## 2020-04-28 RX ORDER — GLIMEPIRIDE 1 MG/1
1 TABLET ORAL EVERY MORNING
Qty: 90 TABLET | Refills: 1 | Status: SHIPPED
Start: 2020-04-28 | End: 2020-08-14 | Stop reason: SDUPTHER

## 2020-05-15 ENCOUNTER — OFFICE VISIT (OUTPATIENT)
Dept: FAMILY MEDICINE CLINIC | Age: 70
End: 2020-05-15
Payer: MEDICARE

## 2020-05-15 VITALS
OXYGEN SATURATION: 96 % | TEMPERATURE: 97.6 F | HEART RATE: 71 BPM | BODY MASS INDEX: 34.39 KG/M2 | SYSTOLIC BLOOD PRESSURE: 148 MMHG | WEIGHT: 188 LBS | DIASTOLIC BLOOD PRESSURE: 90 MMHG

## 2020-05-15 PROBLEM — E78.2 MIXED HYPERLIPIDEMIA: Status: ACTIVE | Noted: 2020-05-15

## 2020-05-15 PROCEDURE — 2022F DILAT RTA XM EVC RTNOPTHY: CPT | Performed by: INTERNAL MEDICINE

## 2020-05-15 PROCEDURE — 1036F TOBACCO NON-USER: CPT | Performed by: INTERNAL MEDICINE

## 2020-05-15 PROCEDURE — 4040F PNEUMOC VAC/ADMIN/RCVD: CPT | Performed by: INTERNAL MEDICINE

## 2020-05-15 PROCEDURE — 3017F COLORECTAL CA SCREEN DOC REV: CPT | Performed by: INTERNAL MEDICINE

## 2020-05-15 PROCEDURE — G9899 SCRN MAM PERF RSLTS DOC: HCPCS | Performed by: INTERNAL MEDICINE

## 2020-05-15 PROCEDURE — 99214 OFFICE O/P EST MOD 30 MIN: CPT | Performed by: INTERNAL MEDICINE

## 2020-05-15 PROCEDURE — G8400 PT W/DXA NO RESULTS DOC: HCPCS | Performed by: INTERNAL MEDICINE

## 2020-05-15 PROCEDURE — 1123F ACP DISCUSS/DSCN MKR DOCD: CPT | Performed by: INTERNAL MEDICINE

## 2020-05-15 PROCEDURE — G8427 DOCREV CUR MEDS BY ELIG CLIN: HCPCS | Performed by: INTERNAL MEDICINE

## 2020-05-15 PROCEDURE — 3044F HG A1C LEVEL LT 7.0%: CPT | Performed by: INTERNAL MEDICINE

## 2020-05-15 PROCEDURE — 1090F PRES/ABSN URINE INCON ASSESS: CPT | Performed by: INTERNAL MEDICINE

## 2020-05-15 PROCEDURE — G8417 CALC BMI ABV UP PARAM F/U: HCPCS | Performed by: INTERNAL MEDICINE

## 2020-05-15 RX ORDER — ROSUVASTATIN CALCIUM 20 MG/1
20 TABLET, COATED ORAL DAILY
Qty: 90 TABLET | Refills: 1 | Status: SHIPPED
Start: 2020-05-15 | End: 2020-11-16 | Stop reason: SDUPTHER

## 2020-05-15 ASSESSMENT — PATIENT HEALTH QUESTIONNAIRE - PHQ9
1. LITTLE INTEREST OR PLEASURE IN DOING THINGS: 0
SUM OF ALL RESPONSES TO PHQ9 QUESTIONS 1 & 2: 0
SUM OF ALL RESPONSES TO PHQ QUESTIONS 1-9: 0
SUM OF ALL RESPONSES TO PHQ QUESTIONS 1-9: 0
2. FEELING DOWN, DEPRESSED OR HOPELESS: 0

## 2020-05-15 NOTE — PROGRESS NOTES
5/15/2020     Jackson Cortes (:  1950) is a 65 y.o. female, here for diabetic and hypertensive checkup. She said she is not driven in a while and her blood pressure is elevated today. Last laboratory performed 90 days ago showed a cholesterol elevated at 209 and triglycerides 287 but the hemoglobin A1c was excellent at review of her fasting blood sugar log shows Fbs is in the 120s and pms in the 180s    Chief Complaint   Patient presents with    Diabetes     Dr Symone Agrawal in [address] [place] [address]    Prior to Visit Medications    Medication Sig Taking?  Authorizing Provider   glimepiride (AMARYL) 1 MG tablet Take 1 tablet by mouth every morning Yes Minor Saas, DO   chlorpheniramine (CHLOR-TRIMETON) 4 MG tablet Take 1 tablet by mouth every 6 hours as needed for Allergies Yes Minor Saas, DO   levothyroxine (SYNTHROID) 75 MCG tablet Take 1 tablet by mouth Daily Yes Minor Saas, DO   triamterene-hydrochlorothiazide (DYAZIDE) 37.5-25 MG per capsule Take 1 capsule by mouth daily Yes Minor Saas, DO   bisoprolol-hydrochlorothiazide [facility]) 5-6.25 MG per tablet Take 1 tablet by mouth daily Yes Minor Saas, DO   simvastatin (ZOCOR) 80 MG tablet Take 1 tablet by mouth nightly Yes Minor Saas, DO   metFORMIN (GLUCOPHAGE) 1000 MG tablet Take 0.5 tablets by mouth 2 times daily (with meals) Yes Minor Saas, DO   fluticasone (FLONASE) 50 MCG/ACT nasal spray 1 spray by Nasal route daily Yes Minor Saas, DO   Insulin Syringe-Needle U-100 31G X \" 0.5 ML MISC 1 each by Does not apply route daily Yes Minor Saas, DO   aspirin 81 MG tablet Take 81 mg by mouth daily Yes Historical Provider, MD   vitamin B-12 (CYANOCOBALAMIN) 100 MCG tablet Take 50 mcg by mouth daily Yes Historical Provider, MD   ASA-APAP-Caff Buffered 503-397-15 MG TABS Take by mouth Yes Historical Provider, MD   L-Lysine 1000 MG TABS Take by mouth Yes equal, round, and reactive to light. Cardiovascular:      Rate and Rhythm: Normal rate and regular rhythm. Pulses: Normal pulses. Pulmonary:      Effort: Pulmonary effort is normal.      Breath sounds: No wheezing, rhonchi or rales. Musculoskeletal:      Right lower leg: No edema. Left lower leg: No edema. Lymphadenopathy:      Cervical: No cervical adenopathy. Skin:     General: Skin is warm and dry. Neurological:      General: No focal deficit present. Mental Status: She is alert and oriented to person, place, and time. Psychiatric:         Mood and Affect: Mood normal.         Behavior: Behavior normal.         ASSESSMENT/PLAN:  There are no diagnoses linked to this encounter. We are to postpone laboratory today will recheck at next visit in 3 months. I have discontinued the Zocor and instead will use Crestor 20 mg daily. No other refills of medication are necessary. Screening mammogram has been ordered. An electronic signature was used to authenticate this note.     --Benny Glez, DO on 5/15/2020 at 10:58 AM

## 2020-07-14 RX ORDER — GLUCOSAMINE HCL/CHONDROITIN SU 500-400 MG
CAPSULE ORAL
Qty: 100 STRIP | Refills: 11 | Status: SHIPPED
Start: 2020-07-14 | End: 2020-08-05 | Stop reason: SDUPTHER

## 2020-07-14 NOTE — TELEPHONE ENCOUNTER
Last Appointment:  5/15/2020  Future Appointments   Date Time Provider Juanjo Arcos   8/14/2020 10:45 AM Марина Kothari  [address]

## 2020-08-05 RX ORDER — GLUCOSAMINE HCL/CHONDROITIN SU 500-400 MG
CAPSULE ORAL
Qty: 100 STRIP | Refills: 11 | Status: SHIPPED
Start: 2020-08-05 | End: 2020-08-06 | Stop reason: SDUPTHER

## 2020-08-06 RX ORDER — GLUCOSAMINE HCL/CHONDROITIN SU 500-400 MG
CAPSULE ORAL
Qty: 100 STRIP | Refills: 11 | Status: SHIPPED | OUTPATIENT
Start: 2020-08-06

## 2020-08-14 ENCOUNTER — HOSPITAL ENCOUNTER (OUTPATIENT)
Age: 70
Discharge: HOME | End: 2020-08-16
Payer: MEDICARE

## 2020-08-14 ENCOUNTER — OFFICE VISIT (OUTPATIENT)
Dept: FAMILY MEDICINE CLINIC | Age: 70
End: 2020-08-14
Payer: MEDICARE

## 2020-08-14 VITALS
TEMPERATURE: 98 F | SYSTOLIC BLOOD PRESSURE: 136 MMHG | DIASTOLIC BLOOD PRESSURE: 80 MMHG | HEART RATE: 72 BPM | HEIGHT: 62 IN | WEIGHT: 183 LBS | OXYGEN SATURATION: 96 % | BODY MASS INDEX: 33.68 KG/M2

## 2020-08-14 LAB
ALBUMIN SERPL-MCNC: 4 G/DL (ref 3.5–5.2)
ALP BLD-CCNC: 45 U/L (ref 35–104)
ALT SERPL-CCNC: 20 U/L (ref 0–32)
ANION GAP SERPL CALCULATED.3IONS-SCNC: 18 MMOL/L (ref 7–16)
AST SERPL-CCNC: 21 U/L (ref 0–31)
BASOPHILS ABSOLUTE: 0.03 E9/L (ref 0–0.2)
BASOPHILS RELATIVE PERCENT: 0.3 % (ref 0–2)
BILIRUB SERPL-MCNC: <0.2 MG/DL (ref 0–1.2)
BILIRUBIN DIRECT: <0.2 MG/DL (ref 0–0.3)
BILIRUBIN, INDIRECT: NORMAL MG/DL (ref 0–1)
BUN BLDV-MCNC: 21 MG/DL (ref 8–23)
CALCIUM SERPL-MCNC: 9.7 MG/DL (ref 8.6–10.2)
CHLORIDE BLD-SCNC: 100 MMOL/L (ref 98–107)
CHOLESTEROL, TOTAL: 191 MG/DL (ref 0–199)
CO2: 24 MMOL/L (ref 22–29)
CREAT SERPL-MCNC: 1.3 MG/DL (ref 0.5–1)
EOSINOPHILS ABSOLUTE: 0.15 E9/L (ref 0.05–0.5)
EOSINOPHILS RELATIVE PERCENT: 1.4 % (ref 0–6)
FOLATE: >20 NG/ML (ref 4.8–24.2)
GFR AFRICAN AMERICAN: 49
GFR NON-AFRICAN AMERICAN: 40 ML/MIN/1.73
GLUCOSE BLD-MCNC: 93 MG/DL (ref 74–99)
HBA1C MFR BLD: 7.9 % (ref 4–5.6)
HCT VFR BLD CALC: 38.6 % (ref 34–48)
HDLC SERPL-MCNC: 48 MG/DL
HEMOGLOBIN: 11.5 G/DL (ref 11.5–15.5)
IMMATURE GRANULOCYTES #: 0.02 E9/L
IMMATURE GRANULOCYTES %: 0.2 % (ref 0–5)
LDL CHOLESTEROL CALCULATED: 73 MG/DL (ref 0–99)
LYMPHOCYTES ABSOLUTE: 2.53 E9/L (ref 1.5–4)
LYMPHOCYTES RELATIVE PERCENT: 23.1 % (ref 20–42)
MCH RBC QN AUTO: 26.7 PG (ref 26–35)
MCHC RBC AUTO-ENTMCNC: 29.8 % (ref 32–34.5)
MCV RBC AUTO: 89.6 FL (ref 80–99.9)
MONOCYTES ABSOLUTE: 0.68 E9/L (ref 0.1–0.95)
MONOCYTES RELATIVE PERCENT: 6.2 % (ref 2–12)
NEUTROPHILS ABSOLUTE: 7.53 E9/L (ref 1.8–7.3)
NEUTROPHILS RELATIVE PERCENT: 68.8 % (ref 43–80)
PDW BLD-RTO: 14.2 FL (ref 11.5–15)
PHOSPHORUS: 4.5 MG/DL (ref 2.5–4.5)
PLATELET # BLD: 302 E9/L (ref 130–450)
PMV BLD AUTO: 10.4 FL (ref 7–12)
POTASSIUM SERPL-SCNC: 4.6 MMOL/L (ref 3.5–5)
RBC # BLD: 4.31 E12/L (ref 3.5–5.5)
SODIUM BLD-SCNC: 142 MMOL/L (ref 132–146)
TOTAL PROTEIN: 7.1 G/DL (ref 6.4–8.3)
TRIGL SERPL-MCNC: 349 MG/DL (ref 0–149)
TSH SERPL DL<=0.05 MIU/L-ACNC: 0.51 UIU/ML (ref 0.27–4.2)
VITAMIN B-12: 1740 PG/ML (ref 211–946)
VLDLC SERPL CALC-MCNC: 70 MG/DL
WBC # BLD: 10.9 E9/L (ref 4.5–11.5)

## 2020-08-14 PROCEDURE — G8427 DOCREV CUR MEDS BY ELIG CLIN: HCPCS | Performed by: INTERNAL MEDICINE

## 2020-08-14 PROCEDURE — 1123F ACP DISCUSS/DSCN MKR DOCD: CPT | Performed by: INTERNAL MEDICINE

## 2020-08-14 PROCEDURE — 84075 ASSAY ALKALINE PHOSPHATASE: CPT

## 2020-08-14 PROCEDURE — 1090F PRES/ABSN URINE INCON ASSESS: CPT | Performed by: INTERNAL MEDICINE

## 2020-08-14 PROCEDURE — 85025 COMPLETE CBC W/AUTO DIFF WBC: CPT

## 2020-08-14 PROCEDURE — 99214 OFFICE O/P EST MOD 30 MIN: CPT | Performed by: INTERNAL MEDICINE

## 2020-08-14 PROCEDURE — G9899 SCRN MAM PERF RSLTS DOC: HCPCS | Performed by: INTERNAL MEDICINE

## 2020-08-14 PROCEDURE — 84450 TRANSFERASE (AST) (SGOT): CPT

## 2020-08-14 PROCEDURE — 82247 BILIRUBIN TOTAL: CPT

## 2020-08-14 PROCEDURE — 3044F HG A1C LEVEL LT 7.0%: CPT | Performed by: INTERNAL MEDICINE

## 2020-08-14 PROCEDURE — 84443 ASSAY THYROID STIM HORMONE: CPT

## 2020-08-14 PROCEDURE — 4040F PNEUMOC VAC/ADMIN/RCVD: CPT | Performed by: INTERNAL MEDICINE

## 2020-08-14 PROCEDURE — 3017F COLORECTAL CA SCREEN DOC REV: CPT | Performed by: INTERNAL MEDICINE

## 2020-08-14 PROCEDURE — 2022F DILAT RTA XM EVC RTNOPTHY: CPT | Performed by: INTERNAL MEDICINE

## 2020-08-14 PROCEDURE — 82746 ASSAY OF FOLIC ACID SERUM: CPT

## 2020-08-14 PROCEDURE — 84460 ALANINE AMINO (ALT) (SGPT): CPT

## 2020-08-14 PROCEDURE — 1036F TOBACCO NON-USER: CPT | Performed by: INTERNAL MEDICINE

## 2020-08-14 PROCEDURE — G8400 PT W/DXA NO RESULTS DOC: HCPCS | Performed by: INTERNAL MEDICINE

## 2020-08-14 PROCEDURE — 36415 COLL VENOUS BLD VENIPUNCTURE: CPT

## 2020-08-14 PROCEDURE — G8417 CALC BMI ABV UP PARAM F/U: HCPCS | Performed by: INTERNAL MEDICINE

## 2020-08-14 PROCEDURE — 84155 ASSAY OF PROTEIN SERUM: CPT

## 2020-08-14 PROCEDURE — 82607 VITAMIN B-12: CPT

## 2020-08-14 PROCEDURE — 80069 RENAL FUNCTION PANEL: CPT

## 2020-08-14 PROCEDURE — 83036 HEMOGLOBIN GLYCOSYLATED A1C: CPT

## 2020-08-14 PROCEDURE — 82248 BILIRUBIN DIRECT: CPT

## 2020-08-14 PROCEDURE — 80061 LIPID PANEL: CPT

## 2020-08-14 RX ORDER — GLIMEPIRIDE 1 MG/1
1 TABLET ORAL EVERY MORNING
Qty: 90 TABLET | Refills: 1 | Status: SHIPPED | OUTPATIENT
Start: 2020-08-14

## 2020-08-14 RX ORDER — BISOPROLOL FUMARATE AND HYDROCHLOROTHIAZIDE 5; 6.25 MG/1; MG/1
1 TABLET ORAL DAILY
Qty: 90 TABLET | Refills: 1 | Status: SHIPPED | OUTPATIENT
Start: 2020-08-14

## 2020-08-14 RX ORDER — LEVOTHYROXINE SODIUM 0.07 MG/1
75 TABLET ORAL DAILY
Qty: 90 TABLET | Refills: 1 | Status: SHIPPED | OUTPATIENT
Start: 2020-08-14

## 2020-08-14 RX ORDER — TRIAMTERENE AND HYDROCHLOROTHIAZIDE 37.5; 25 MG/1; MG/1
1 CAPSULE ORAL DAILY
Qty: 90 CAPSULE | Refills: 1 | Status: SHIPPED | OUTPATIENT
Start: 2020-08-14

## 2020-08-14 NOTE — PROGRESS NOTES
mouth Yes Historical Provider, MD   L-Lysine 1000 MG TABS Take by mouth Yes Historical Provider, MD   Magnesium 100 MG CAPS  Yes Historical Provider, MD   Omega-3 Krill Oil 1000 MG CAPS Take by mouth Yes Historical Provider, MD   Potassium Gluconate 2 MEQ TABS Take by mouth Yes Historical Provider, MD   Biotin 1 MG CAPS Take by mouth Yes Historical Provider, MD   estradiol (ESTRACE) 1 MG tablet Take 0.5 mg by mouth daily QOTHER DAY Yes Historical Provider, MD   folic acid (FOLVITE) 580 MCG tablet Take by mouth Yes Historical Provider, MD   insulin glargine (LANTUS) 100 UNIT/ML injection vial Inject 100 Units into the skin nightly 30U Yes Historical Provider, MD      Allergies   Allergen Reactions    Levaquin [Levofloxacin In D5w]      Itchy         Past Medical History:   Diagnosis Date    Colon cancer ([facility] [address]     HTN (hypertension)      Past Surgical History:   Procedure Laterality Date    BLADDER REPAIR      COLONOSCOPY  2018    HEMICOLECTOMY      HYSTERECTOMY, TOTAL ABDOMINAL      OTHER SURGICAL HISTORY  2013    A+P repair with suprapubic TVT with cytoscopy      Social History     Tobacco Use    Smoking status: Never Smoker    Smokeless tobacco: Never Used   Substance Use Topics    Alcohol use: Not on file        Vitals:    08/14/20 1045   BP: 136/80   Pulse: 72   Temp: 98 °F (36.7 °C)   SpO2: 96%   Weight: 183 lb (83 kg)   Height: 5' 2\" (1.575 m)     Estimated body mass index is 33.47 kg/m² as calculated from the following:    Height as of this encounter: 5' 2\" (1.575 m). Weight as of this encounter: 183 lb (83 kg). down 5 pounds    Physical Exam  Constitutional:       Appearance: Normal appearance. She is not toxic-appearing. HENT:      Head: Normocephalic. Right Ear: Tympanic membrane, ear canal and external ear normal.      Left Ear: Tympanic membrane, ear canal and external ear normal.      Mouth/Throat:      Mouth: Mucous membranes are moist.      Pharynx: Oropharynx is clear.    Eyes: bisoprolol-hydrochlorothiazide (ZIAC) 5-6.25 MG per tablet; Take 1 tablet by mouth daily  -     metFORMIN (GLUCOPHAGE) 1000 MG tablet; Take 0.5 tablets by mouth 2 times daily (with meals)  -     levothyroxine (SYNTHROID) 75 MCG tablet; Take 1 tablet by mouth Daily    Will have laboratory performed today. No changes in her current medication is appears is working fairly well. Her back in 3 months she will have a flu shot at that time. An electronic signature was used to authenticate this note.     --Celine Escalante,  on 8/14/2020 at 11:11 AM

## 2020-10-08 ENCOUNTER — NURSE ONLY (OUTPATIENT)
Dept: FAMILY MEDICINE CLINIC | Age: 70
End: 2020-10-08
Payer: MEDICARE

## 2020-10-08 ENCOUNTER — TELEPHONE (OUTPATIENT)
Dept: FAMILY MEDICINE CLINIC | Age: 70
End: 2020-10-08

## 2020-10-08 PROCEDURE — 90694 VACC AIIV4 NO PRSRV 0.5ML IM: CPT | Performed by: INTERNAL MEDICINE

## 2020-10-08 PROCEDURE — G0008 ADMIN INFLUENZA VIRUS VAC: HCPCS | Performed by: INTERNAL MEDICINE

## 2020-11-16 ENCOUNTER — OFFICE VISIT (OUTPATIENT)
Dept: FAMILY MEDICINE CLINIC | Age: 70
End: 2020-11-16
Payer: MEDICARE

## 2020-11-16 VITALS
OXYGEN SATURATION: 97 % | DIASTOLIC BLOOD PRESSURE: 80 MMHG | TEMPERATURE: 97.1 F | BODY MASS INDEX: 33.65 KG/M2 | WEIGHT: 184 LBS | SYSTOLIC BLOOD PRESSURE: 150 MMHG | HEART RATE: 68 BPM

## 2020-11-16 PROCEDURE — 4040F PNEUMOC VAC/ADMIN/RCVD: CPT | Performed by: INTERNAL MEDICINE

## 2020-11-16 PROCEDURE — G8484 FLU IMMUNIZE NO ADMIN: HCPCS | Performed by: INTERNAL MEDICINE

## 2020-11-16 PROCEDURE — G8400 PT W/DXA NO RESULTS DOC: HCPCS | Performed by: INTERNAL MEDICINE

## 2020-11-16 PROCEDURE — 1036F TOBACCO NON-USER: CPT | Performed by: INTERNAL MEDICINE

## 2020-11-16 PROCEDURE — G8427 DOCREV CUR MEDS BY ELIG CLIN: HCPCS | Performed by: INTERNAL MEDICINE

## 2020-11-16 PROCEDURE — 1123F ACP DISCUSS/DSCN MKR DOCD: CPT | Performed by: INTERNAL MEDICINE

## 2020-11-16 PROCEDURE — 99214 OFFICE O/P EST MOD 30 MIN: CPT | Performed by: INTERNAL MEDICINE

## 2020-11-16 PROCEDURE — 3017F COLORECTAL CA SCREEN DOC REV: CPT | Performed by: INTERNAL MEDICINE

## 2020-11-16 PROCEDURE — 1090F PRES/ABSN URINE INCON ASSESS: CPT | Performed by: INTERNAL MEDICINE

## 2020-11-16 PROCEDURE — 3051F HG A1C>EQUAL 7.0%<8.0%: CPT | Performed by: INTERNAL MEDICINE

## 2020-11-16 PROCEDURE — 2022F DILAT RTA XM EVC RTNOPTHY: CPT | Performed by: INTERNAL MEDICINE

## 2020-11-16 PROCEDURE — G0009 ADMIN PNEUMOCOCCAL VACCINE: HCPCS | Performed by: INTERNAL MEDICINE

## 2020-11-16 PROCEDURE — G9899 SCRN MAM PERF RSLTS DOC: HCPCS | Performed by: INTERNAL MEDICINE

## 2020-11-16 PROCEDURE — G8417 CALC BMI ABV UP PARAM F/U: HCPCS | Performed by: INTERNAL MEDICINE

## 2020-11-16 PROCEDURE — 90670 PCV13 VACCINE IM: CPT | Performed by: INTERNAL MEDICINE

## 2020-11-16 RX ORDER — ROSUVASTATIN CALCIUM 20 MG/1
20 TABLET, COATED ORAL DAILY
Qty: 90 TABLET | Refills: 1 | Status: SHIPPED | OUTPATIENT
Start: 2020-11-16

## 2020-11-16 NOTE — PROGRESS NOTES
2020     Kerrie Celis (:  1950) is a 65 y.o. female, here for [address] follow-up. She did not bring her log in with her today that her fasting sugars are between 90 and 110. Evening sugars are still around 200. She is unable to walk for any kind of distance due to the right hip pain. She denies any chest pains or shortness of breath. No nausea vomiting or diarrhea. Pneumovax 23 given 2013  She had her flu shot here last month  Diabetic eye exam: Oct 2020  Chief Complaint   Patient presents with    3 Month Follow-Up         Review of Systems    Prior to Visit Medications    Medication Sig Taking?  Authorizing Provider   rosuvastatin (CRESTOR) 20 MG tablet Take 1 tablet by mouth daily Yes Wisam Lino DO   triamterene-hydroCHLOROthiazide (DYAZIDE) 37.5-25 MG per capsule Take 1 capsule by mouth daily Yes Wisam Lino DO   glimepiride (AMARYL) 1 MG tablet Take 1 tablet by mouth every morning Yes Wisam Lino DO   bisoprolol-hydrochlorothiazide [facility]) 5-6.25 MG per tablet Take 1 tablet by mouth daily Yes Wisam Lino DO   metFORMIN (GLUCOPHAGE) 1000 MG tablet Take 0.5 tablets by mouth 2 times daily (with meals) Yes Wisam Lino DO   levothyroxine (SYNTHROID) 75 MCG tablet Take 1 tablet by mouth Daily Yes Wisam Lino DO   fluticasone (FLONASE) 50 MCG/ACT nasal spray 1 spray by Nasal route daily Yes Wisam Lino DO   aspirin 81 MG tablet Take 81 mg by mouth daily Yes Historical Provider, MD   vitamin B-12 (CYANOCOBALAMIN) 100 MCG tablet Take 50 mcg by mouth daily Yes Historical Provider, MD   L-Lysine 1000 MG TABS Take by mouth Yes Historical Provider, MD   Magnesium 100 MG CAPS  Yes Historical Provider, MD   Omega-3 Krill Oil 1000 MG CAPS Take by mouth Yes Historical Provider, MD   Potassium Gluconate 2 MEQ TABS Take by mouth Yes Historical Provider, MD   Biotin 1 MG CAPS Take by mouth Yes Historical Provider, MD estradiol (ESTRACE) 1 MG tablet Take 0.5 mg by mouth daily QOTHER DAY Yes Historical Provider, MD   folic acid (FOLVITE) 707 MCG tablet Take by mouth Yes Historical Provider, MD   insulin glargine (LANTUS) 100 UNIT/ML injection vial Inject 100 Units into the skin nightly 30U Yes Historical Provider, MD   blood glucose monitor strips [employer] Glucose testing strips. Patient is testing 2 times a day. Maryln Daily, DO   Insulin Syringe-Needle U-100 31G X 5/16\" 0.5 ML MISC 1 each by Does not apply route daily  Maryln Daily, DO   ASA-APAP-Caff Buffered 570-299-29 MG TABS Take by mouth  Historical Provider, MD      Allergies   Allergen Reactions    Levaquin [Levofloxacin In D5w]      Itchy         Past Medical History:   Diagnosis Date    Colon cancer ([facility] [address]     HTN (hypertension)      Past Surgical History:   Procedure Laterality Date    BLADDER REPAIR      COLONOSCOPY  2018    HEMICOLECTOMY      HYSTERECTOMY, TOTAL ABDOMINAL      OTHER SURGICAL HISTORY  2013    A+P repair with suprapubic TVT with cytoscopy      Social History     Tobacco Use    Smoking status: Never Smoker    Smokeless tobacco: Never Used   Substance Use Topics    Alcohol use: Not on file        Vitals:    11/16/20 1001   BP: (!) 150/80   Pulse: 68   Temp: 97.1 °F (36.2 °C)   SpO2: 97%   Weight: 184 lb (83.5 kg)     Estimated body mass index is 33.65 kg/m² as calculated from the following:    Height as of 8/14/20: 5' 2\" (1.575 m). Weight as of this encounter: 184 lb (83.5 kg). Physical Exam  Constitutional:       Appearance: Normal appearance. She is not ill-appearing or toxic-appearing. HENT:      Head: Normocephalic and atraumatic. Right Ear: Tympanic membrane, ear canal and external ear normal.      Left Ear: Tympanic membrane, ear canal and external ear normal.      Nose: Nose normal.      Mouth/Throat:      Mouth: Mucous membranes are moist.      Pharynx: Oropharynx is clear.    Eyes:      Extraocular Movements: Extraocular movements intact. Conjunctiva/sclera: Conjunctivae normal.      Pupils: Pupils are equal, round, and reactive to light. Cardiovascular:      Rate and Rhythm: Normal rate and regular rhythm. Pulses: Normal pulses. Pulmonary:      Effort: Pulmonary effort is normal.      Breath sounds: Normal breath sounds. No wheezing, rhonchi or rales. Lymphadenopathy:      Cervical: No cervical adenopathy. Skin:     General: Skin is warm and dry. Capillary Refill: Capillary refill takes less than 2 seconds. Neurological:      Mental Status: She is alert. Psychiatric:         Mood and Affect: Mood normal.         Behavior: Behavior normal.         ASSESSMENT/PLAN:  Mikey Cardenas was seen today for 3 month follow-up. Diagnoses and all orders for this visit:    Type 2 diabetes mellitus without complication, with long-term current use of insulin ([facility])    Mixed hyperlipidemia    Essential hypertension    Other orders  -     rosuvastatin (CRESTOR) 20 MG tablet; Take 1 tablet by mouth daily  -     PREVNAR 13 IM (Pneumococcal conjugate vaccine 13-valent)         She is going to decrease the Lantus to 25 units. Continue to monitor her blood sugars and record days. She will send these to me in 1 month. Refill the Crestor given today. It is too early for the laboratory to be performed today. Prevnar 13 to be given today. An electronic signature was used to authenticate this note.     --Reed Acosta DO on 11/16/2020 at 10:38 AM

## 2020-11-30 RX ORDER — BLOOD SUGAR DIAGNOSTIC
1 STRIP MISCELLANEOUS DAILY
Qty: 100 EACH | Refills: 5 | Status: SHIPPED | OUTPATIENT
Start: 2020-11-30

## 2021-12-29 ENCOUNTER — HOSPITAL ENCOUNTER (OUTPATIENT)
Dept: HOSPITAL 83 - COVID19 | Age: 71
Discharge: HOME | End: 2021-12-29
Attending: STUDENT IN AN ORGANIZED HEALTH CARE EDUCATION/TRAINING PROGRAM
Payer: COMMERCIAL

## 2021-12-29 DIAGNOSIS — Z11.52: Primary | ICD-10-CM

## 2022-03-01 ENCOUNTER — HOSPITAL ENCOUNTER (OUTPATIENT)
Dept: HOSPITAL 83 - RESCLI | Age: 72
Discharge: HOME | End: 2022-03-01
Attending: INTERNAL MEDICINE
Payer: MEDICARE

## 2022-03-01 DIAGNOSIS — E78.2: Primary | ICD-10-CM

## 2022-03-01 DIAGNOSIS — Z79.899: ICD-10-CM

## 2022-03-01 DIAGNOSIS — Z88.8: ICD-10-CM

## 2022-03-01 DIAGNOSIS — Z79.4: ICD-10-CM

## 2022-03-01 DIAGNOSIS — N18.31: ICD-10-CM

## 2022-03-01 DIAGNOSIS — E11.22: ICD-10-CM

## 2022-03-01 DIAGNOSIS — M54.30: ICD-10-CM

## 2022-03-01 DIAGNOSIS — I12.9: ICD-10-CM

## 2022-03-01 DIAGNOSIS — I25.10: ICD-10-CM

## 2022-03-01 DIAGNOSIS — K21.9: ICD-10-CM

## 2022-03-01 DIAGNOSIS — E55.9: ICD-10-CM

## 2022-03-01 DIAGNOSIS — J30.2: ICD-10-CM

## 2022-03-01 DIAGNOSIS — Z98.890: ICD-10-CM

## 2022-03-01 DIAGNOSIS — E03.9: ICD-10-CM

## 2022-09-13 ENCOUNTER — HOSPITAL ENCOUNTER (OUTPATIENT)
Dept: HOSPITAL 83 - RESCLI | Age: 72
Discharge: HOME | End: 2022-09-13
Attending: INTERNAL MEDICINE
Payer: MEDICARE

## 2022-09-13 DIAGNOSIS — I25.10: ICD-10-CM

## 2022-09-13 DIAGNOSIS — I13.10: Primary | ICD-10-CM

## 2022-09-13 DIAGNOSIS — E03.9: ICD-10-CM

## 2022-09-13 DIAGNOSIS — J30.2: ICD-10-CM

## 2022-09-13 DIAGNOSIS — N18.9: ICD-10-CM

## 2022-09-13 DIAGNOSIS — E11.22: ICD-10-CM

## 2022-09-13 DIAGNOSIS — Z79.899: ICD-10-CM

## 2022-09-13 DIAGNOSIS — K21.9: ICD-10-CM

## 2022-09-13 DIAGNOSIS — Z88.8: ICD-10-CM

## 2022-09-13 DIAGNOSIS — Z90.710: ICD-10-CM

## 2022-09-13 DIAGNOSIS — Z79.01: ICD-10-CM

## 2022-09-13 DIAGNOSIS — E63.9: ICD-10-CM

## 2022-09-13 DIAGNOSIS — Z79.82: ICD-10-CM

## 2022-09-13 DIAGNOSIS — Z79.4: ICD-10-CM

## 2022-09-13 DIAGNOSIS — Z90.49: ICD-10-CM

## 2022-09-13 DIAGNOSIS — E55.9: ICD-10-CM

## 2022-09-13 DIAGNOSIS — E78.2: ICD-10-CM

## 2022-10-03 ENCOUNTER — HOSPITAL ENCOUNTER (OUTPATIENT)
Dept: HOSPITAL 83 - RAD | Age: 72
Discharge: HOME | End: 2022-10-03
Attending: INTERNAL MEDICINE
Payer: MEDICARE

## 2022-10-03 DIAGNOSIS — Z13.820: ICD-10-CM

## 2022-10-03 DIAGNOSIS — M81.0: Primary | ICD-10-CM

## 2022-12-08 NOTE — TELEPHONE ENCOUNTER
Called pt and there was no answer Last Appointment:  2/14/2020  Future Appointments   Date Time Provider Juanjo Arcos   5/15/2020 10:45 AM DO Moody Alvarado asking for a refill on glimepiride to [facility]  And chlorpheniramine to [facility]

## 2023-03-14 ENCOUNTER — HOSPITAL ENCOUNTER (OUTPATIENT)
Dept: HOSPITAL 83 - RESCLI | Age: 73
Discharge: HOME | End: 2023-03-14
Attending: STUDENT IN AN ORGANIZED HEALTH CARE EDUCATION/TRAINING PROGRAM
Payer: COMMERCIAL

## 2023-03-14 DIAGNOSIS — Z98.890: ICD-10-CM

## 2023-03-14 DIAGNOSIS — E03.9: ICD-10-CM

## 2023-03-14 DIAGNOSIS — I25.10: ICD-10-CM

## 2023-03-14 DIAGNOSIS — J30.2: ICD-10-CM

## 2023-03-14 DIAGNOSIS — E63.9: ICD-10-CM

## 2023-03-14 DIAGNOSIS — Z88.1: ICD-10-CM

## 2023-03-14 DIAGNOSIS — N18.30: ICD-10-CM

## 2023-03-14 DIAGNOSIS — Z90.710: ICD-10-CM

## 2023-03-14 DIAGNOSIS — Z87.891: ICD-10-CM

## 2023-03-14 DIAGNOSIS — I12.9: ICD-10-CM

## 2023-03-14 DIAGNOSIS — Z79.84: ICD-10-CM

## 2023-03-14 DIAGNOSIS — E55.9: ICD-10-CM

## 2023-03-14 DIAGNOSIS — Z90.49: ICD-10-CM

## 2023-03-14 DIAGNOSIS — E78.2: ICD-10-CM

## 2023-03-14 DIAGNOSIS — Z79.4: Primary | ICD-10-CM

## 2023-03-14 DIAGNOSIS — Z79.899: ICD-10-CM

## 2023-03-14 DIAGNOSIS — E11.22: ICD-10-CM

## 2023-09-14 ENCOUNTER — HOSPITAL ENCOUNTER (OUTPATIENT)
Dept: HOSPITAL 83 - RESCLI | Age: 73
Discharge: HOME | End: 2023-09-14
Attending: STUDENT IN AN ORGANIZED HEALTH CARE EDUCATION/TRAINING PROGRAM
Payer: COMMERCIAL

## 2023-09-14 DIAGNOSIS — E78.5: ICD-10-CM

## 2023-09-14 DIAGNOSIS — K21.9: ICD-10-CM

## 2023-09-14 DIAGNOSIS — Z98.890: ICD-10-CM

## 2023-09-14 DIAGNOSIS — E11.9: Primary | ICD-10-CM

## 2023-09-14 DIAGNOSIS — Z87.891: ICD-10-CM

## 2023-09-14 DIAGNOSIS — I10: ICD-10-CM

## 2023-09-14 DIAGNOSIS — Z88.8: ICD-10-CM

## 2023-09-14 DIAGNOSIS — Z79.899: ICD-10-CM

## 2023-09-14 DIAGNOSIS — E03.9: ICD-10-CM

## 2023-09-14 DIAGNOSIS — Z88.1: ICD-10-CM

## 2024-09-12 ENCOUNTER — HOSPITAL ENCOUNTER (OUTPATIENT)
Dept: HOSPITAL 83 - RESCLI | Age: 74
Discharge: HOME | End: 2024-09-12
Attending: STUDENT IN AN ORGANIZED HEALTH CARE EDUCATION/TRAINING PROGRAM
Payer: MEDICARE
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
## 2024-09-12 DIAGNOSIS — Z87.891: ICD-10-CM

## 2024-09-12 DIAGNOSIS — N18.30: ICD-10-CM

## 2024-09-12 DIAGNOSIS — Z90.710: ICD-10-CM

## 2024-09-12 DIAGNOSIS — I12.9: Primary | ICD-10-CM

## 2024-09-12 DIAGNOSIS — E53.8: ICD-10-CM

## 2024-09-12 DIAGNOSIS — E63.9: ICD-10-CM

## 2024-09-12 DIAGNOSIS — Z88.8: ICD-10-CM

## 2024-09-12 DIAGNOSIS — E11.22: ICD-10-CM

## 2024-09-12 DIAGNOSIS — N63.0: ICD-10-CM

## 2024-09-12 DIAGNOSIS — Z90.49: ICD-10-CM

## 2024-09-12 DIAGNOSIS — Z98.890: ICD-10-CM

## 2024-09-12 DIAGNOSIS — E03.9: ICD-10-CM

## 2024-09-12 DIAGNOSIS — F10.90: ICD-10-CM

## 2024-09-12 DIAGNOSIS — I25.10: ICD-10-CM

## 2024-09-12 DIAGNOSIS — E78.2: ICD-10-CM

## 2024-09-12 DIAGNOSIS — K21.9: ICD-10-CM
